# Patient Record
Sex: FEMALE | Race: WHITE | Employment: STUDENT | URBAN - NONMETROPOLITAN AREA
[De-identification: names, ages, dates, MRNs, and addresses within clinical notes are randomized per-mention and may not be internally consistent; named-entity substitution may affect disease eponyms.]

---
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
## 2024-11-20 ENCOUNTER — TELEPHONE (OUTPATIENT)
Dept: OBGYN CLINIC | Age: 19
End: 2024-11-20

## 2024-11-20 NOTE — TELEPHONE ENCOUNTER
Pt called in stating she needed to update her insurance on her account. After discussing with the pt we were unable to add her insurance as the system stated she is not eligable. Pt r/s appt to 12/10 for an in office visit to give her time to reach out to her insurance to resolve the issue.

## 2024-12-10 ENCOUNTER — OFFICE VISIT (OUTPATIENT)
Dept: OBGYN CLINIC | Age: 19
End: 2024-12-10
Payer: COMMERCIAL

## 2024-12-10 VITALS
BODY MASS INDEX: 25.3 KG/M2 | WEIGHT: 134 LBS | HEART RATE: 81 BPM | DIASTOLIC BLOOD PRESSURE: 77 MMHG | HEIGHT: 61 IN | SYSTOLIC BLOOD PRESSURE: 118 MMHG

## 2024-12-10 DIAGNOSIS — Z30.09 GENERAL COUNSELING AND ADVICE FOR CONTRACEPTIVE MANAGEMENT: Primary | ICD-10-CM

## 2024-12-10 PROCEDURE — 99213 OFFICE O/P EST LOW 20 MIN: CPT

## 2024-12-10 RX ORDER — HYDROXYZINE HYDROCHLORIDE 10 MG/1
10 TABLET, FILM COATED ORAL 3 TIMES DAILY PRN
COMMUNITY

## 2024-12-10 RX ORDER — SERTRALINE HYDROCHLORIDE 100 MG/1
100 TABLET, FILM COATED ORAL DAILY
COMMUNITY

## 2024-12-10 SDOH — ECONOMIC STABILITY: FOOD INSECURITY: WITHIN THE PAST 12 MONTHS, YOU WORRIED THAT YOUR FOOD WOULD RUN OUT BEFORE YOU GOT MONEY TO BUY MORE.: NEVER TRUE

## 2024-12-10 SDOH — ECONOMIC STABILITY: FOOD INSECURITY: WITHIN THE PAST 12 MONTHS, THE FOOD YOU BOUGHT JUST DIDN'T LAST AND YOU DIDN'T HAVE MONEY TO GET MORE.: NEVER TRUE

## 2024-12-10 SDOH — ECONOMIC STABILITY: INCOME INSECURITY: HOW HARD IS IT FOR YOU TO PAY FOR THE VERY BASICS LIKE FOOD, HOUSING, MEDICAL CARE, AND HEATING?: NOT HARD AT ALL

## 2024-12-10 SDOH — ECONOMIC STABILITY: TRANSPORTATION INSECURITY
IN THE PAST 12 MONTHS, HAS LACK OF TRANSPORTATION KEPT YOU FROM MEETINGS, WORK, OR FROM GETTING THINGS NEEDED FOR DAILY LIVING?: NO

## 2024-12-10 ASSESSMENT — ENCOUNTER SYMPTOMS
DIARRHEA: 0
SHORTNESS OF BREATH: 0
RECTAL PAIN: 0
CHEST TIGHTNESS: 0
NAUSEA: 0
ABDOMINAL PAIN: 0
GASTROINTESTINAL NEGATIVE: 1
RESPIRATORY NEGATIVE: 1
CONSTIPATION: 0
BACK PAIN: 0

## 2024-12-10 ASSESSMENT — PATIENT HEALTH QUESTIONNAIRE - PHQ9
2. FEELING DOWN, DEPRESSED OR HOPELESS: NOT AT ALL
2. FEELING DOWN, DEPRESSED OR HOPELESS: NOT AT ALL
1. LITTLE INTEREST OR PLEASURE IN DOING THINGS: NOT AT ALL
SUM OF ALL RESPONSES TO PHQ QUESTIONS 1-9: 0
SUM OF ALL RESPONSES TO PHQ9 QUESTIONS 1 & 2: 0
SUM OF ALL RESPONSES TO PHQ QUESTIONS 1-9: 0
SUM OF ALL RESPONSES TO PHQ9 QUESTIONS 1 & 2: 0
SUM OF ALL RESPONSES TO PHQ QUESTIONS 1-9: 0
1. LITTLE INTEREST OR PLEASURE IN DOING THINGS: NOT AT ALL
SUM OF ALL RESPONSES TO PHQ QUESTIONS 1-9: 0

## 2024-12-10 NOTE — PATIENT INSTRUCTIONS
Patient wants to try copper IUD, [facility]

## 2024-12-10 NOTE — PROGRESS NOTES
[facility]   
Pt presents today to discuss order an IUD or possible implant.. Currently using the Annovera ring. Would like to go over all the options. States she likes the Annovera, she is just looking for something more long term and permanent.   
        MEDICATIONS:  No orders of the defined types were placed in this encounter.      ORDERS:  No orders of the defined types were placed in this encounter.      Plan:  Discussed IUD and Nexplanon. Thorough discussion regarding Mirena vs copper IUD Paragard. Patient would like paragard, educated that we cannot insert those at [facility] but I recommend she contact [facility] and she does not need referral. Brochure given about Mirena and Nexplanon

## 2024-12-11 ENCOUNTER — TELEPHONE (OUTPATIENT)
Dept: OBGYN CLINIC | Age: 19
End: 2024-12-11

## 2024-12-11 NOTE — TELEPHONE ENCOUNTER
Pt called stating she would like to proceed with ordering the Mirena instead of the Paragard. Please contact pt to discuss, thank you.

## 2024-12-11 NOTE — TELEPHONE ENCOUNTER
Called and spoke with pt at this time and she wanting to get a mirena ordered instead of the Copper IUD. Pt questioning about getting this scheduled at this time. Let pt know that we would get her scheduled once the IUD comes in. Pt voiced understanding at this time. This will be fwd to [facility] for the ordering of IUD.

## 2024-12-11 NOTE — TELEPHONE ENCOUNTER
[facility] . But patient wanted to get it from [facility].

## 2024-12-20 ENCOUNTER — TELEPHONE (OUTPATIENT)
Dept: OBGYN CLINIC | Age: 19
End: 2024-12-20

## 2024-12-20 NOTE — TELEPHONE ENCOUNTER
Called pt and let her know her IUD is only covered under the buy and bill option which we do not offer at our office. Offered pt to set up vv and discuss other options but pt was not interested at this time. Pt asked about paying out of pocket for IUD and let her know she can contact the [facility] pharmacy directly. Gave pt phone number to contact. Pt is going to look into other facilities to see if they offer the buy and bill option, but if not she may reach back out to us to explore other options.

## 2025-01-03 ENCOUNTER — TELEPHONE (OUTPATIENT)
Dept: OBGYN CLINIC | Age: 20
End: 2025-01-03

## 2025-01-03 NOTE — TELEPHONE ENCOUNTER
Patient wanted [facility] send a referral to [facility] for hormonal IUD. Patient wanted to see Dr. Lucila Rivas.

## 2025-01-07 DIAGNOSIS — Z30.09 GENERAL COUNSELING AND ADVICE FOR CONTRACEPTIVE MANAGEMENT: Primary | ICD-10-CM

## 2025-06-17 ENCOUNTER — OFFICE VISIT (OUTPATIENT)
Age: 20
End: 2025-06-17

## 2025-06-17 VITALS
SYSTOLIC BLOOD PRESSURE: 116 MMHG | BODY MASS INDEX: 26.83 KG/M2 | HEART RATE: 70 BPM | DIASTOLIC BLOOD PRESSURE: 70 MMHG | WEIGHT: 142 LBS | OXYGEN SATURATION: 98 % | TEMPERATURE: 97.9 F | RESPIRATION RATE: 18 BRPM

## 2025-06-17 DIAGNOSIS — Z32.02 PREGNANCY EXAMINATION OR TEST, NEGATIVE RESULT: Primary | ICD-10-CM

## 2025-06-17 LAB
CONTROL: ABNORMAL
PREGNANCY TEST URINE, POC: ABNORMAL

## 2025-06-17 ASSESSMENT — ENCOUNTER SYMPTOMS
COUGH: 0
ABDOMINAL PAIN: 0
TROUBLE SWALLOWING: 0
NAUSEA: 0
ABDOMINAL DISTENTION: 0
DIARRHEA: 0
CONSTIPATION: 0
SINUS PRESSURE: 0
COLOR CHANGE: 0
SHORTNESS OF BREATH: 0
WHEEZING: 0
SORE THROAT: 0
VOMITING: 0
EYE PAIN: 0
EYE ITCHING: 0
APNEA: 0
RHINORRHEA: 0
CHEST TIGHTNESS: 0
SINUS PAIN: 0
EYE DISCHARGE: 0

## 2025-06-17 NOTE — PROGRESS NOTES
[facility], [facility] ([place])  [facility]  [address]  [place] [address]  Dept: [phone]  Dept Fax: [phone]    Ashley Chirinos is a 20 y.o. female who presents today for her medical conditions/complaints as noted below.      HPI:   HPI    Ashley Chirinos presents today for evaluation of a urine pregnancy test. Reports that she has a hormonal IUD and she is 7 days late. Has had this IUD since since February. Reports her periods are usually regular. Reports she had a faint positive urine test at home. Reports some normal PMS symptoms but no bleeding or abdominal pain.    No past medical history on file.    No past surgical history on file.    Family History   Problem Relation Age of Onset    Cancer Paternal Grandmother     Diabetes Maternal Grandmother        Social History     Tobacco Use    Smoking status: Never     Passive exposure: Yes    Smokeless tobacco: Never   Substance Use Topics    Alcohol use: Not on file        Current Outpatient Medications   Medication Sig Dispense Refill    sertraline (ZOLOFT) 100 MG tablet Take 1 tablet by mouth daily      hydrOXYzine HCl (ATARAX) 10 MG tablet Take 1 tablet by mouth 3 times daily as needed for Itching      Segesterone-Ethinyl Estradiol (ANNOVERA) 0.15-0.013 MG/24HR RING Place ring vaginally continuously for 21 days, remove and wash with soapy water, leave out for 7 days and then repeat for 13 cycles. 1 each 0     No current facility-administered medications for this visit.       Allergies   Allergen Reactions    Smoke No More [A-G Pro] Shortness Of Breath     Watery eyes    Cat Dander      sneezing       Health Maintenance   Topic Date Due    Chlamydia/GC screen  Never done    Pap smear  Never done    Depression Screen  12/10/2025    DTaP/Tdap/Td vaccine (8 - Td or Tdap) 03/25/2032    Hepatitis A vaccine  Completed    Hepatitis B vaccine  Completed    Hib vaccine  Completed    HPV vaccine  Completed    Polio vaccine

## 2025-06-20 ENCOUNTER — RESULTS FOLLOW-UP (OUTPATIENT)
Age: 20
End: 2025-06-20

## 2025-06-20 ENCOUNTER — OFFICE VISIT (OUTPATIENT)
Age: 20
End: 2025-06-20

## 2025-06-20 VITALS
OXYGEN SATURATION: 97 % | BODY MASS INDEX: 26.83 KG/M2 | RESPIRATION RATE: 20 BRPM | DIASTOLIC BLOOD PRESSURE: 60 MMHG | TEMPERATURE: 99.4 F | HEART RATE: 100 BPM | WEIGHT: 142 LBS | SYSTOLIC BLOOD PRESSURE: 104 MMHG

## 2025-06-20 DIAGNOSIS — R11.2 NAUSEA AND VOMITING, UNSPECIFIED VOMITING TYPE: ICD-10-CM

## 2025-06-20 DIAGNOSIS — R50.9 FEVER, UNSPECIFIED FEVER CAUSE: Primary | ICD-10-CM

## 2025-06-20 LAB
INFLUENZA A ANTIBODY: NORMAL
INFLUENZA B ANTIBODY: NORMAL
Lab: NORMAL
QC PASS/FAIL: NORMAL
SARS-COV-2, POC: NORMAL

## 2025-06-20 NOTE — PROGRESS NOTES
Ashley Chirinos (:  2005) is a 20 y.o. female,Established patient, here for evaluation of the following chief complaint(s):  Fever, Generalized Body Aches, and Nausea ( X 2 days)      Assessment & Plan :  Visit Diagnoses and Associated Orders         Fever, unspecified fever cause    -  Primary    POCT COVID-19 Antigen Card [35411 CPT(R)]      POCT Influenza A/B [21358 Custom]             Nausea and vomiting, unspecified vomiting type                 Covid test = neg  Flu test = neg  Reassured - symptoms possibly viral in nature  Increase oral fluid intake  REST  If symptoms persists in 2-3 days, need to retest  Symptomatic tx for fever and body aches  Pt states she will get meclizine for nausea and dizziness     Follow up with PCP - Maggy Marina ([place], [place])       Subjective :  HPI     20 y.o. female presents with acute onset of fever and HA yesterday. Temp was 99. She states she continued to get worse with body aches, nausea ,but  no vomiting. No known ill contacts. This am, her temp was 102. Appetite is fair. She states she is able to tolerate liquids         Vitals:    25 0742   BP: 104/60   Pulse: 100   Resp: 20   Temp: 99.4 °F (37.4 °C)   TempSrc: Temporal   SpO2: 97%   Weight: 64.4 kg (142 lb)       No results found for this visit on 25.      Objective   Physical Exam  Vitals and nursing note reviewed.   Constitutional:       Appearance: Normal appearance. She is ill-appearing (mildly). She is not toxic-appearing.   HENT:      Head: Normocephalic.      Right Ear: Ear canal and external ear normal.      Left Ear: Ear canal and external ear normal.      Ears:      Comments: +bilateral middle ear effusion     Nose: No rhinorrhea.      Mouth/Throat:      Pharynx: Oropharynx is clear.   Cardiovascular:      Rate and Rhythm: Normal rate and regular rhythm.      Heart sounds: Normal heart sounds. No murmur heard.  Pulmonary:      Effort: Pulmonary effort is normal.      Breath sounds: Normal